# Patient Record
Sex: FEMALE
[De-identification: names, ages, dates, MRNs, and addresses within clinical notes are randomized per-mention and may not be internally consistent; named-entity substitution may affect disease eponyms.]

---

## 2024-03-06 ENCOUNTER — APPOINTMENT (OUTPATIENT)
Dept: VASCULAR SURGERY | Facility: CLINIC | Age: 44
End: 2024-03-06
Payer: COMMERCIAL

## 2024-03-06 VITALS
HEART RATE: 75 BPM | BODY MASS INDEX: 23.92 KG/M2 | WEIGHT: 135 LBS | TEMPERATURE: 97.8 F | SYSTOLIC BLOOD PRESSURE: 111 MMHG | DIASTOLIC BLOOD PRESSURE: 73 MMHG | OXYGEN SATURATION: 98 % | HEIGHT: 63 IN

## 2024-03-06 DIAGNOSIS — I83.813 VARICOSE VEINS OF BILATERAL LOWER EXTREMITIES WITH PAIN: ICD-10-CM

## 2024-03-06 DIAGNOSIS — I87.2 VENOUS INSUFFICIENCY (CHRONIC) (PERIPHERAL): ICD-10-CM

## 2024-03-06 DIAGNOSIS — I83.893 VARICOSE VEINS OF BILATERAL LOWER EXTREMITIES WITH OTHER COMPLICATIONS: ICD-10-CM

## 2024-03-06 PROBLEM — Z00.00 ENCOUNTER FOR PREVENTIVE HEALTH EXAMINATION: Status: ACTIVE | Noted: 2024-03-06

## 2024-03-06 PROCEDURE — 99204 OFFICE O/P NEW MOD 45 MIN: CPT | Mod: 25

## 2024-03-06 PROCEDURE — 93970 EXTREMITY STUDY: CPT

## 2024-03-06 NOTE — PHYSICAL EXAM
[2+] : left 2+ [Ankle Swelling Bilaterally] : bilaterally  [Ankle Swelling (On Exam)] : not present [Varicose Veins Of Lower Extremities] : bilaterally [Ankle Swelling On The Right] : mild [] : not present [No Rash or Lesion] : No rash or lesion [Purpura] : no purpura  [Petechiae] : no petechiae [Skin Ulcer] : no ulcer [Skin Induration] : no induration [Oriented to Person] : oriented to person [Alert] : alert [Oriented to Place] : oriented to place [Calm] : calm [Oriented to Time] : oriented to time [de-identified] : fit wdwn nad [FreeTextEntry1] : small scattered surface veins both legs [de-identified] : wnl

## 2024-03-06 NOTE — REASON FOR VISIT
[Initial Evaluation] : an initial evaluation [FreeTextEntry1] : pt here with concerns about clusters of small surface veins both legs (especially left medial thigh) associated with aches, cramps, tenderness, throbbing - intereferes with her running - also worse with long hors of stading daily - pt ti sfit, healthy, runner - not pregnant or breast feeding - b/l vle with rvt shows rt lsv reflux - pt to wear daily thigh high support hose 20-30 mmHg and return for b/l sclerotherapy - referred by her dermatologist.

## 2024-03-06 NOTE — ASSESSMENT
[FreeTextEntry1] : pt to return for b/l sclerotherapy Rx given for thigh high support hose 20-30 mmHg [Other: _____] : [unfilled]

## 2024-03-06 NOTE — VITALS
[Dull] : dull [Aching] : aching [Throbbing] : throbbing [FreeTextEntry3] : b/l scattered small surface veins both legs